# Patient Record
Sex: MALE | Race: WHITE | NOT HISPANIC OR LATINO | Employment: FULL TIME | URBAN - METROPOLITAN AREA
[De-identification: names, ages, dates, MRNs, and addresses within clinical notes are randomized per-mention and may not be internally consistent; named-entity substitution may affect disease eponyms.]

---

## 2020-09-30 ENCOUNTER — OFFICE VISIT (OUTPATIENT)
Dept: PLASTIC SURGERY | Facility: CLINIC | Age: 50
End: 2020-09-30
Payer: COMMERCIAL

## 2020-09-30 VITALS — WEIGHT: 288.81 LBS | HEIGHT: 69 IN | BODY MASS INDEX: 42.78 KG/M2

## 2020-09-30 DIAGNOSIS — L08.9 POST-TRAUMATIC WOUND INFECTION: Primary | ICD-10-CM

## 2020-09-30 DIAGNOSIS — T14.8XXA POST-TRAUMATIC WOUND INFECTION: Primary | ICD-10-CM

## 2020-09-30 PROCEDURE — 99203 OFFICE O/P NEW LOW 30 MIN: CPT | Mod: S$GLB,,, | Performed by: SURGERY

## 2020-09-30 PROCEDURE — 99999 PR PBB SHADOW E&M-NEW PATIENT-LVL III: CPT | Mod: PBBFAC,,, | Performed by: SURGERY

## 2020-09-30 PROCEDURE — 99203 PR OFFICE/OUTPT VISIT, NEW, LEVL III, 30-44 MIN: ICD-10-PCS | Mod: S$GLB,,, | Performed by: SURGERY

## 2020-09-30 PROCEDURE — 3008F BODY MASS INDEX DOCD: CPT | Mod: CPTII,S$GLB,, | Performed by: SURGERY

## 2020-09-30 PROCEDURE — 99999 PR PBB SHADOW E&M-NEW PATIENT-LVL III: ICD-10-PCS | Mod: PBBFAC,,, | Performed by: SURGERY

## 2020-09-30 PROCEDURE — 3008F PR BODY MASS INDEX (BMI) DOCUMENTED: ICD-10-PCS | Mod: CPTII,S$GLB,, | Performed by: SURGERY

## 2020-09-30 RX ORDER — ZOLPIDEM TARTRATE 12.5 MG/1
12.5 TABLET, FILM COATED, EXTENDED RELEASE ORAL NIGHTLY PRN
COMMUNITY
End: 2020-10-09

## 2020-09-30 RX ORDER — LOSARTAN POTASSIUM 25 MG/1
25 TABLET ORAL DAILY
COMMUNITY

## 2020-09-30 RX ORDER — PANTOPRAZOLE SODIUM 40 MG/1
40 TABLET, DELAYED RELEASE ORAL DAILY
COMMUNITY

## 2020-09-30 NOTE — PROGRESS NOTES
"Plastic Surgery Consultation    Date of Consultation:   09/30/2020    Reason for Consultation:  Chronic non-healing leg wound    History of Present Illness:  48 yo male with h/o HTN who presents for evaluation of lower left leg wound. He reports traumatic injury 2 months ago where a staircase he was walking down collapsed into underlying [place] water and he fell back. His wounds since then have not healed. He has been doing wet to dry dressings. Denies fevers and other s/s of infection.     Past Medical History:    has no past medical history on file.    Past Surgical History:    has no past surgical history on file.    Social History:  Social History     Tobacco Use    Smoking status: Never Smoker   Substance Use Topics    Alcohol use: Not on file     Social History     Substance and Sexual Activity   Drug Use Not on file       Family History:  No family history on file.    Allergies:  Review of patient's allergies indicates:  No Known Allergies    Home Medications:  Prior to Admission medications    Medication Sig Start Date End Date Taking? Authorizing Provider   losartan (COZAAR) 25 MG tablet Take 25 mg by mouth once daily.   Yes Historical Provider   pantoprazole (PROTONIX) 40 MG tablet Take 40 mg by mouth once daily.   Yes Historical Provider   zolpidem (AMBIEN CR) 12.5 MG CR tablet Take 12.5 mg by mouth nightly as needed for Insomnia.   Yes Historical Provider       Review of Systems:  Negative except for what is noted in HPI    Physical Exam:  VITAL SIGNS:   Vitals:    09/30/20 0959   Weight: 131 kg (288 lb 12.8 oz)   Height: 5' 9" (1.753 m)     TMAX: @TMAX(24)@  General: Alert; No acute distress  Cardiovascular: Regular rate   Respiratory: Normal respiratory effort. Equal excursion.   Abdomen: Soft, nontender, nondistended  Extremity: Moves all extremities equally.  Neurologic: No focal deficit. Speech normal  Skin: Two wounds on posterior of left lower leg, near the ankle. Both are approximately 1 cm deep " with visible granulation tissue, minimal fibrotic tissue. Some surrounding skin purple and red skin discoloration and cellulitis. No drainage. Non-tender. They do not probe. Left wound measures approximately 2 x 2 x 1 cm; right wound is similar in size and depth. No foul odor. 2+ pitting edema.            Assessment:  49 y.o.male with no major PMH presenting for evaluation of chronic non-healing left leg wound s/p fall in fresh water. He has significant edema and works on his feet, which is likely contruibuting to his wound. Cultures have never been obtained. Counseled patient on managing his leg swelling and continuing local wound care, as flap coverage isn't possible in setting of such edema. Will also obtain cultures and refer to ID.     Plan:  RTC in 3 weeks  Cultures taken of both wounds, will FU results  Continue wet to dry dressings  Manage LE edema - elevation, compression, low salt diet        Alba Woodall MD  PGY-1, Plastic Surgery  [facility]

## 2020-10-09 ENCOUNTER — OFFICE VISIT (OUTPATIENT)
Dept: INFECTIOUS DISEASES | Facility: CLINIC | Age: 50
End: 2020-10-09
Payer: COMMERCIAL

## 2020-10-09 ENCOUNTER — CLINICAL SUPPORT (OUTPATIENT)
Dept: INFECTIOUS DISEASES | Facility: CLINIC | Age: 50
End: 2020-10-09
Payer: COMMERCIAL

## 2020-10-09 VITALS
HEART RATE: 80 BPM | TEMPERATURE: 99 F | WEIGHT: 287.69 LBS | HEIGHT: 69 IN | DIASTOLIC BLOOD PRESSURE: 97 MMHG | BODY MASS INDEX: 42.61 KG/M2 | SYSTOLIC BLOOD PRESSURE: 141 MMHG

## 2020-10-09 DIAGNOSIS — T14.8XXA POST-TRAUMATIC WOUND INFECTION: ICD-10-CM

## 2020-10-09 DIAGNOSIS — L08.9 POST-TRAUMATIC WOUND INFECTION: ICD-10-CM

## 2020-10-09 PROCEDURE — 90715 TDAP VACCINE GREATER THAN OR EQUAL TO 7YO IM: ICD-10-PCS | Mod: S$GLB,,, | Performed by: INTERNAL MEDICINE

## 2020-10-09 PROCEDURE — 99999 PR PBB SHADOW E&M-EST. PATIENT-LVL III: ICD-10-PCS | Mod: PBBFAC,,, | Performed by: INTERNAL MEDICINE

## 2020-10-09 PROCEDURE — 90715 TDAP VACCINE 7 YRS/> IM: CPT | Mod: S$GLB,,, | Performed by: INTERNAL MEDICINE

## 2020-10-09 PROCEDURE — 99999 PR PBB SHADOW E&M-EST. PATIENT-LVL I: ICD-10-PCS | Mod: PBBFAC,,,

## 2020-10-09 PROCEDURE — 99244 OFF/OP CNSLTJ NEW/EST MOD 40: CPT | Mod: 25,S$GLB,, | Performed by: INTERNAL MEDICINE

## 2020-10-09 PROCEDURE — 90471 IMMUNIZATION ADMIN: CPT | Mod: S$GLB,,, | Performed by: INTERNAL MEDICINE

## 2020-10-09 PROCEDURE — 99999 PR PBB SHADOW E&M-EST. PATIENT-LVL III: CPT | Mod: PBBFAC,,, | Performed by: INTERNAL MEDICINE

## 2020-10-09 PROCEDURE — 99999 PR PBB SHADOW E&M-EST. PATIENT-LVL I: CPT | Mod: PBBFAC,,,

## 2020-10-09 PROCEDURE — 99244 PR OFFICE CONSULTATION,LEVEL IV: ICD-10-PCS | Mod: 25,S$GLB,, | Performed by: INTERNAL MEDICINE

## 2020-10-09 PROCEDURE — 90471 TDAP VACCINE GREATER THAN OR EQUAL TO 7YO IM: ICD-10-PCS | Mod: S$GLB,,, | Performed by: INTERNAL MEDICINE

## 2020-10-09 RX ORDER — METRONIDAZOLE 500 MG/1
500 TABLET ORAL 3 TIMES DAILY
Qty: 42 TABLET | Refills: 0 | Status: SHIPPED | OUTPATIENT
Start: 2020-10-09 | End: 2020-10-23

## 2020-10-09 RX ORDER — ZOLPIDEM TARTRATE 10 MG/1
10 TABLET ORAL
COMMUNITY
Start: 2020-08-14

## 2020-10-09 RX ORDER — LEVOFLOXACIN 750 MG/1
750 TABLET ORAL DAILY
Qty: 14 TABLET | Refills: 0 | Status: SHIPPED | OUTPATIENT
Start: 2020-10-09 | End: 2020-10-23

## 2020-10-09 NOTE — PROGRESS NOTES
Subjective:      Patient ID: Chris Christopher is a 50 y.o. male.    Chief Complaint: Wound      History of Present Illness    Patient is accompanied by his partner today, Mo who is a nurse here at [facility]. Patient is here for slow to heal wound.  Patient fell through rotten board [place] on Sat Aug 1st. Had a small scratch on left calf but no large open wound. However the following Monday had extensive swelling and bruising and blisters developed. 1-2 days later additional blistering occurred the ruptured and formed eschars. Leg was swollen over twice normal size. Took bactrim and doxy. Not sure if that helped.The eschars then developed into deep wounds. Has been following with family MD.Cleaning wound in to shower then using wet to dry dressings.  Culture done.   Saw Dr. 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 Is wearing compression.  Not sure when last tetanus was given.  Has gotten flu vaccine. Has H/O HTN. No liver or kidney disease.      Review of Systems   Constitution: Negative for chills, decreased appetite, fever, malaise/fatigue, night sweats, weight gain and weight loss.   HENT: Negative for congestion, ear pain, hearing loss, hoarse voice, sore throat and tinnitus.    Eyes: Negative for blurred vision, redness and visual disturbance.   Cardiovascular: Positive for leg swelling. Negative for chest pain and palpitations.   Respiratory: Positive for cough. Negative for hemoptysis, shortness of breath and sputum production.    Hematologic/Lymphatic: Negative for adenopathy. Does not bruise/bleed easily.   Skin: Positive for itching. Negative for dry skin, rash and suspicious lesions.   Musculoskeletal: Negative for back pain, joint pain, myalgias and neck pain.   Gastrointestinal: Negative for abdominal pain, constipation, diarrhea, heartburn, nausea and vomiting.   Genitourinary: Negative  for dysuria, flank pain, frequency, hematuria, hesitancy and urgency.   Neurological: Negative for dizziness, headaches, numbness, paresthesias and weakness.   Psychiatric/Behavioral: Negative for depression and memory loss. The patient does not have insomnia and is not nervous/anxious.      Objective:   Physical Exam  Vitals signs and nursing note reviewed.   Constitutional:       General: He is not in acute distress.     Appearance: Normal appearance. He is well-developed. He is not toxic-appearing or diaphoretic.   HENT:      Head: Normocephalic and atraumatic. No right periorbital erythema or left periorbital erythema.      Right Ear: External ear normal.      Left Ear: External ear normal.      Nose: Nose normal.   Eyes:      General: Lids are normal. No scleral icterus.        Right eye: No discharge.         Left eye: No discharge.      Conjunctiva/sclera:      Right eye: Right conjunctiva is not injected.      Left eye: Left conjunctiva is not injected.      Pupils: Pupils are equal, round, and reactive to light.   Neck:      Musculoskeletal: Normal range of motion. No erythema.   Cardiovascular:      Rate and Rhythm: Normal rate.   Pulmonary:      Effort: Pulmonary effort is normal. No tachypnea or respiratory distress.      Breath sounds: No stridor.   Abdominal:      General: There is no distension.   Musculoskeletal: Normal range of motion.      Comments: Left posterior calf- 2 wounds about 0.5 cm deep and 2 cm in diameter. Base with 90%beefy granulation tissue. Some fibrinous non adherent slough.  Surrounding tissue mild erythema. Has edema present.   Skin:     General: Skin is warm and dry.      Findings: Erythema present. No rash.   Neurological:      Mental Status: He is alert and oriented to person, place, and time.      Cranial Nerves: No cranial nerve deficit.      Coordination: Coordination normal.   Psychiatric:         Speech: Speech normal.         Behavior: Behavior normal. Behavior is  cooperative.         Thought Content: Thought content normal.         Judgment: Judgment normal.       Assessment:       1. Post-traumatic wound infection          Plan:       Diagnoses and all orders for this visit:    Post-traumatic wound infection  -     Ambulatory referral/consult to Infectious Disease    Other orders  -     Tdap Vaccine; Future  -     levoFLOXacin (LEVAQUIN) 750 MG tablet; Take 1 tablet (750 mg total) by mouth once daily. for 14 days  -     metroNIDAZOLE (FLAGYL) 500 MG tablet; Take 1 tablet (500 mg total) by mouth 3 (three) times daily. for 14 days    Outside culture results reviewed. Positive for Klebsiella oxytoca, Raoultella ornithinolytica, E coli, and bacteroides.    Asked patient to cleanse wound with OTC wound cleanser, dry, apply silver gel and pack then cover.  Will see him back when he follows up with plastic surgery.  Discussed ABX side effects.  Let me know if concerns prior to next visit.

## 2020-10-09 NOTE — LETTER
October 11, 2020      Edis López MD  [address]  [facility] [address]           [facility] - Infectious Disease 1st Fl  [address]  [facility] [address]  Phone: [phone]  Fax: [phone]          Patient: Chris Christopher   MR Number: [id number]   YOB: 1970   Date of Visit: 10/9/2020       Dear Dr. Edis López:    Thank you for referring Chris Christopher to me for evaluation. Attached you will find relevant portions of my assessment and plan of care.    If you have questions, please do not hesitate to call me. I look forward to following Chris Christopher along with you.    Sincerely,    Katherine L. Baumgarten, MD    Enclosure  CC:  No Recipients    If you would like to receive this communication electronically, please contact [email or web address] or [phone] to request more information on [employer] Link access.    For providers and/or their staff who would like to refer a patient to [facility], please contact us through our one-stop-shop provider referral line, [facility], at [phone].    If you feel you have received this communication in error or would no longer like to receive these types of communications, please e-mail [email or web address]

## 2020-10-20 NOTE — TELEPHONE ENCOUNTER
----- Message from Irvin Nina sent at 10/20/2020  1:12 PM CDT -----  Regarding: Prescription Inquiry  levoFLOXacin (LEVAQUIN) 750 MG tablet     metroNIDAZOLE (FLAGYL) 500 MG tablet       Pt called requesting a call back in regards to reaction to above medications       [phone]

## 2020-10-28 ENCOUNTER — OFFICE VISIT (OUTPATIENT)
Dept: INFECTIOUS DISEASES | Facility: CLINIC | Age: 50
End: 2020-10-28
Payer: COMMERCIAL

## 2020-10-28 ENCOUNTER — OFFICE VISIT (OUTPATIENT)
Dept: PLASTIC SURGERY | Facility: CLINIC | Age: 50
End: 2020-10-28
Payer: COMMERCIAL

## 2020-10-28 VITALS
WEIGHT: 285.06 LBS | HEIGHT: 69 IN | SYSTOLIC BLOOD PRESSURE: 148 MMHG | BODY MASS INDEX: 42.22 KG/M2 | TEMPERATURE: 98 F | HEART RATE: 76 BPM | DIASTOLIC BLOOD PRESSURE: 95 MMHG

## 2020-10-28 VITALS
SYSTOLIC BLOOD PRESSURE: 133 MMHG | HEIGHT: 69 IN | WEIGHT: 287.69 LBS | DIASTOLIC BLOOD PRESSURE: 77 MMHG | BODY MASS INDEX: 42.61 KG/M2 | HEART RATE: 97 BPM

## 2020-10-28 DIAGNOSIS — T14.8XXA POST-TRAUMATIC WOUND INFECTION: Primary | ICD-10-CM

## 2020-10-28 DIAGNOSIS — L08.9 POST-TRAUMATIC WOUND INFECTION: Primary | ICD-10-CM

## 2020-10-28 PROCEDURE — 99999 PR PBB SHADOW E&M-EST. PATIENT-LVL III: ICD-10-PCS | Mod: PBBFAC,,, | Performed by: SURGERY

## 2020-10-28 PROCEDURE — 3008F PR BODY MASS INDEX (BMI) DOCUMENTED: ICD-10-PCS | Mod: CPTII,S$GLB,, | Performed by: INTERNAL MEDICINE

## 2020-10-28 PROCEDURE — 99999 PR PBB SHADOW E&M-EST. PATIENT-LVL III: CPT | Mod: PBBFAC,,, | Performed by: SURGERY

## 2020-10-28 PROCEDURE — 3008F BODY MASS INDEX DOCD: CPT | Mod: CPTII,S$GLB,, | Performed by: SURGERY

## 2020-10-28 PROCEDURE — 99212 OFFICE O/P EST SF 10 MIN: CPT | Mod: S$GLB,,, | Performed by: SURGERY

## 2020-10-28 PROCEDURE — 99999 PR PBB SHADOW E&M-EST. PATIENT-LVL III: CPT | Mod: PBBFAC,,, | Performed by: INTERNAL MEDICINE

## 2020-10-28 PROCEDURE — 3008F BODY MASS INDEX DOCD: CPT | Mod: CPTII,S$GLB,, | Performed by: INTERNAL MEDICINE

## 2020-10-28 PROCEDURE — 99999 PR PBB SHADOW E&M-EST. PATIENT-LVL III: ICD-10-PCS | Mod: PBBFAC,,, | Performed by: INTERNAL MEDICINE

## 2020-10-28 PROCEDURE — 3008F PR BODY MASS INDEX (BMI) DOCUMENTED: ICD-10-PCS | Mod: CPTII,S$GLB,, | Performed by: SURGERY

## 2020-10-28 PROCEDURE — 99214 OFFICE O/P EST MOD 30 MIN: CPT | Mod: S$GLB,,, | Performed by: INTERNAL MEDICINE

## 2020-10-28 PROCEDURE — 99214 PR OFFICE/OUTPT VISIT, EST, LEVL IV, 30-39 MIN: ICD-10-PCS | Mod: S$GLB,,, | Performed by: INTERNAL MEDICINE

## 2020-10-28 PROCEDURE — 99212 PR OFFICE/OUTPT VISIT, EST, LEVL II, 10-19 MIN: ICD-10-PCS | Mod: S$GLB,,, | Performed by: SURGERY

## 2020-10-28 RX ORDER — FLUCONAZOLE 200 MG/1
200 TABLET ORAL DAILY
COMMUNITY
Start: 2020-10-20

## 2020-10-28 NOTE — PROGRESS NOTES
Subjective:      Patient ID: Chris Christopher is a 50 y.o. male.    Chief Complaint: Wound      History of Present Illness    Patient is accompanied by his partner today, Mo who is a nurse here at [facility]. Patient is here for slow to heal wound.  Patient fell through rotten board [place] on Sat [place] 1st. Had a small scratch on left calf but no large open wound. However the following Monday had extensive swelling and bruising and blisters developed. 1-2 days later additional blistering occurred the ruptured and formed eschars. Leg was swollen over twice normal size. Took bactrim and doxy. Not sure if that helped.The eschars then developed into deep wounds. Has been following with family MD.Cleaning wound in to shower then using wet to dry dressings.  Culture done.   Saw Dr. 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 Is wearing compression.  Not sure when last tetanus was given.  Has gotten flu vaccine. Has H/O HTN. No liver or kidney disease.      Review of Systems   Constitution: Negative for chills, decreased appetite, fever, malaise/fatigue, night sweats, weight gain and weight loss.   HENT: Negative for congestion, ear pain, hearing loss, hoarse voice, sore throat and tinnitus.    Eyes: Negative for blurred vision, redness and visual disturbance.   Cardiovascular: Positive for leg swelling. Negative for chest pain and palpitations.   Respiratory: Positive for cough. Negative for hemoptysis, shortness of breath and sputum production.    Hematologic/Lymphatic: Negative for adenopathy. Does not bruise/bleed easily.   Skin: Positive for itching. Negative for dry skin, rash and suspicious lesions.   Musculoskeletal: Negative for back pain, joint pain, myalgias and neck pain.   Gastrointestinal: Negative for abdominal pain, constipation, diarrhea, heartburn, nausea and vomiting.   Genitourinary: Negative  for dysuria, flank pain, frequency, hematuria, hesitancy and urgency.   Neurological: Negative for dizziness, headaches, numbness, paresthesias and weakness.   Psychiatric/Behavioral: Negative for depression and memory loss. The patient does not have insomnia and is not nervous/anxious.      Objective:   Physical Exam  Vitals signs and nursing note reviewed.   Constitutional:       General: He is not in acute distress.     Appearance: Normal appearance. He is well-developed. He is not toxic-appearing or diaphoretic.   HENT:      Head: Normocephalic and atraumatic. No right periorbital erythema or left periorbital erythema.      Right Ear: External ear normal.      Left Ear: External ear normal.      Nose: Nose normal.   Eyes:      General: Lids are normal. No scleral icterus.        Right eye: No discharge.         Left eye: No discharge.      Conjunctiva/sclera:      Right eye: Right conjunctiva is not injected.      Left eye: Left conjunctiva is not injected.      Pupils: Pupils are equal, round, and reactive to light.   Neck:      Musculoskeletal: Normal range of motion. No erythema.   Cardiovascular:      Rate and Rhythm: Normal rate.   Pulmonary:      Effort: Pulmonary effort is normal. No tachypnea or respiratory distress.      Breath sounds: No stridor.   Abdominal:      General: There is no distension.   Musculoskeletal: Normal range of motion.      Comments: Left posterior calf- 2 wounds about 0.5 cm deep and 2 cm in diameter. Base with 90%beefy granulation tissue. Some fibrinous non adherent slough.  Surrounding tissue mild erythema. Has edema present.   Skin:     General: Skin is warm and dry.      Findings: Erythema present. No rash.   Neurological:      Mental Status: He is alert and oriented to person, place, and time.      Cranial Nerves: No cranial nerve deficit.      Coordination: Coordination normal.   Psychiatric:         Speech: Speech normal.         Behavior: Behavior normal. Behavior is  cooperative.         Thought Content: Thought content normal.         Judgment: Judgment normal.       Assessment:       No diagnosis found.      Plan:       There are no diagnoses linked to this encounter.Outside culture results reviewed. Positive for Klebsiella oxytoca, Raoultella ornithinolytica, E coli, and bacteroides.    Asked patient to cleanse wound with OTC wound cleanser, dry, apply silver gel and pack then cover.  Will see him back when he follows up with plastic surgery.  Discussed ABX side effects.  Let me know if concerns prior to next visit.

## 2020-10-28 NOTE — PROGRESS NOTES
History & Physical    SUBJECTIVE:   Chief complaint: Chronic non-healing leg wound    History of Present Illness:  50 y.o. male presents to clinic for follow up of chronic non healing leg wound. He presents 1 month after his initial clinic visit. He reports the two leg wounds on his posterior calf have improved in swelling and drainage over the past 2 weeks. He saw ID 3 weeks ago who started him on levaquin and flagyl based on wound cultures which grew Klebsiella, E. Coli, bacteroides, and Raoultella. He has fully completed his antibiotic regimen. He is currently treating with dry dressings. Denies purulence or active drainage. The swelling in his entire LLE has improved substantially. Pain is improving. He denies any fevers/chill or other s/s of infection in the interim. No other acute complaints at this time.     No past medical history on file.    No past surgical history on file.    No family history on file.    Social History     Socioeconomic History    Marital status:      Spouse name: Not on file    Number of children: Not on file    Years of education: Not on file    Highest education level: Not on file   Occupational History    Not on file   Social Needs    Financial resource strain: Not on file    Food insecurity     Worry: Not on file     Inability: Not on file    Transportation needs     Medical: Not on file     Non-medical: Not on file   Tobacco Use    Smoking status: Never Smoker   Substance and Sexual Activity    Alcohol use: Not on file    Drug use: Not on file    Sexual activity: Not on file   Lifestyle    Physical activity     Days per week: Not on file     Minutes per session: Not on file    Stress: Not on file   Relationships    Social connections     Talks on phone: Not on file     Gets together: Not on file     Attends Anabaptism service: Not on file     Active member of club or organization: Not on file     Attends meetings of clubs or organizations: Not on file      "Relationship status: Not on file   Other Topics Concern    Not on file   Social History Narrative    Not on file       Current Outpatient Medications   Medication Sig Dispense Refill    losartan (COZAAR) 25 MG tablet Take 25 mg by mouth once daily.      pantoprazole (PROTONIX) 40 MG tablet Take 40 mg by mouth once daily.      zolpidem (AMBIEN) 10 mg Tab Take 10 mg by mouth.       No current facility-administered medications for this visit.        Review of patient's allergies indicates:  No Known Allergies      Review of Systems:  Constitutional: negative for fevers  Eyes: negative visual changes  ENT: negative for hearing loss  Respiratory: negative for dyspnea  Cardiovascular: negative for chest pain  Gastrointestinal: negative for abdominal pain  Genitourinary: negative for dysuria  Neurological: negative for headaches  Behavioral/Psych: negative for hallucinations  Endocrine: negative for temperature intolerance    Review of systems negative except as pertinent positive and negatives listed above      OBJECTIVE:     /77   Pulse 97   Ht 5' 9" (1.753 m)   Wt 130.5 kg (287 lb 11.2 oz)   BMI 42.49 kg/m²       Physical Exam:  Gen: NAD, Aox3  Neuro: no focal deficits  HEENT: NCAT, neck supple  CV: RRR  Pulm: Breathing non-labored, chest wall movement equal bilaterally  Abdomen: soft, nontender, no guarding  Gu: no rashes or wounds  Extremity: two wounds noted to posterior aspect of left calf: 3x3cm and 2x3cm in size. There is a surrounding rim of erythema. There is a healthy bed of granulation tissue filling in where the eschar was. No underlying bone exposed. Adequate bed of soft tissue. No purulence or drainage noted.   Psych: normal mood and affect          ASSESSMENT/PLAN:     Chris Christopher is a 50 y.o. male following up for a chronic non-healing wound on his left leg. After target antibiotic treatment based on his wound cultures his symptoms have massively improved. His swelling and pain have " significantly decreased and he is managing his wounds with dry dressings. We will continue the current course of treatment and plan to see him back in 6 weeks for wound re-check.     Tiana Burr MD - PGY-1  Plastic Surgery   [facility]

## 2020-10-28 NOTE — LETTER
October 28, 2020      Alba Woodall MD  [address]  [place] [address]           [facility] - Infectious Disease 1st Fl  [address]  [facility] [address]  Phone: [phone]  Fax: [phone]          Patient: Chris Christopher   MR Number: [id number]   YOB: 1970   Date of Visit: 10/28/2020       Dear Dr. Alba Woodall:    Thank you for referring Chris Christopher to me for evaluation. Attached you will find relevant portions of my assessment and plan of care.    If you have questions, please do not hesitate to call me. I look forward to following Chris Christopher along with you.    Sincerely,    Katherine L. Baumgarten, MD    Enclosure  CC:  No Recipients    If you would like to receive this communication electronically, please contact [email or web address] or [phone] to request more information on [employer] Link access.    For providers and/or their staff who would like to refer a patient to [facility], please contact us through our one-stop-shop provider referral line, [facility], at [phone].    If you feel you have received this communication in error or would no longer like to receive these types of communications, please e-mail [email or web address]

## 2020-10-28 NOTE — PROGRESS NOTES
Subjective:      Patient ID: Chris Christopher is a 50 y.o. male.    Chief Complaint:Follow-up      History of Present Illness    Here today for follow up of wound on left calf.  Completed Levaquin and flagyl a few days ago. Did have GI side effects but improving now.   Wound has improved. redness better. Still swollen. No significant pain.   Has filled in well.  Saw Dr. López today also- f/u planned in 6 weeks.      Review of Systems   Constitution: Negative for chills, decreased appetite, fever, malaise/fatigue, night sweats, weight gain and weight loss.   HENT: Negative for congestion, ear pain, hearing loss, hoarse voice, sore throat and tinnitus.    Eyes: Negative for blurred vision, redness and visual disturbance.   Cardiovascular: Positive for leg swelling. Negative for chest pain and palpitations.   Respiratory: Negative for cough, hemoptysis, shortness of breath and sputum production.    Hematologic/Lymphatic: Negative for adenopathy. Does not bruise/bleed easily.   Skin: Positive for suspicious lesions. Negative for dry skin, itching and rash.   Musculoskeletal: Negative for back pain, joint pain, myalgias and neck pain.   Gastrointestinal: Negative for abdominal pain, constipation, diarrhea, heartburn, nausea and vomiting.   Genitourinary: Negative for dysuria, flank pain, frequency, hematuria, hesitancy and urgency.   Neurological: Negative for dizziness, headaches, numbness, paresthesias and weakness.   Psychiatric/Behavioral: Negative for depression and memory loss. The patient does not have insomnia and is not nervous/anxious.      Objective:   Physical Exam  Vitals signs and nursing note reviewed.   Constitutional:       General: He is not in acute distress.     Appearance: Normal appearance. He is well-developed. He is not toxic-appearing or diaphoretic.   HENT:      Head: Normocephalic and atraumatic. No right periorbital erythema or left periorbital erythema.      Right Ear: External ear normal.       Left Ear: External ear normal.      Nose: Nose normal.   Eyes:      General: Lids are normal. No scleral icterus.        Right eye: No discharge.         Left eye: No discharge.      Conjunctiva/sclera:      Right eye: Right conjunctiva is not injected.      Left eye: Left conjunctiva is not injected.      Pupils: Pupils are equal, round, and reactive to light.   Neck:      Musculoskeletal: Normal range of motion. No erythema.   Cardiovascular:      Rate and Rhythm: Normal rate.   Pulmonary:      Effort: Pulmonary effort is normal. No tachypnea or respiratory distress.      Breath sounds: No stridor.   Abdominal:      General: There is no distension.   Musculoskeletal: Normal range of motion.         General: Swelling present.      Comments: Wound- proximal with minimal hypergranulation but has filled in completely with little to no depth. Distal wound with minimal fibrinous exudate but also improved.   Surrounding erythema resolved. Still with edema of ankle.   Skin:     General: Skin is warm and dry.      Findings: No erythema or rash.   Neurological:      Mental Status: He is alert and oriented to person, place, and time.      Cranial Nerves: No cranial nerve deficit.      Coordination: Coordination normal.   Psychiatric:         Speech: Speech normal.         Behavior: Behavior normal. Behavior is cooperative.         Thought Content: Thought content normal.         Judgment: Judgment normal.       Assessment:       1. Post-traumatic wound infection          Plan:       Will hold off on additional antibiotics for now.   Continue local wound care.  Follow up with me when sees Dr. López or sooner if any worsening.  Elevate leg, compression.

## 2020-11-29 RX ORDER — FLUCONAZOLE 200 MG/1
200 TABLET ORAL DAILY
Qty: 7 TABLET | Refills: 0 | OUTPATIENT
Start: 2020-11-29 | End: 2020-12-06

## 2021-11-30 ENCOUNTER — TELEPHONE (OUTPATIENT)
Dept: ADMINISTRATIVE | Facility: OTHER | Age: 51
End: 2021-11-30
Payer: COMMERCIAL